# Patient Record
Sex: MALE | URBAN - METROPOLITAN AREA
[De-identification: names, ages, dates, MRNs, and addresses within clinical notes are randomized per-mention and may not be internally consistent; named-entity substitution may affect disease eponyms.]

---

## 2018-11-27 ENCOUNTER — TELEPHONE (OUTPATIENT)
Dept: FAMILY MEDICINE | Facility: CLINIC | Age: 43
End: 2018-11-27

## 2018-11-27 ENCOUNTER — OFFICE VISIT (OUTPATIENT)
Dept: FAMILY MEDICINE | Facility: CLINIC | Age: 43
End: 2018-11-27
Payer: COMMERCIAL

## 2018-11-27 ENCOUNTER — RADIANT APPOINTMENT (OUTPATIENT)
Dept: GENERAL RADIOLOGY | Facility: CLINIC | Age: 43
End: 2018-11-27
Attending: FAMILY MEDICINE
Payer: COMMERCIAL

## 2018-11-27 VITALS
HEART RATE: 87 BPM | DIASTOLIC BLOOD PRESSURE: 84 MMHG | WEIGHT: 187 LBS | SYSTOLIC BLOOD PRESSURE: 123 MMHG | OXYGEN SATURATION: 96 % | TEMPERATURE: 97.8 F

## 2018-11-27 DIAGNOSIS — G89.29 ACUTE EXACERBATION OF CHRONIC LOW BACK PAIN: Primary | ICD-10-CM

## 2018-11-27 DIAGNOSIS — G89.29 ACUTE EXACERBATION OF CHRONIC LOW BACK PAIN: ICD-10-CM

## 2018-11-27 DIAGNOSIS — E11.8 TYPE 2 DIABETES MELLITUS WITH COMPLICATION, WITHOUT LONG-TERM CURRENT USE OF INSULIN (H): ICD-10-CM

## 2018-11-27 DIAGNOSIS — M54.50 ACUTE EXACERBATION OF CHRONIC LOW BACK PAIN: Primary | ICD-10-CM

## 2018-11-27 DIAGNOSIS — Z98.890 HX OF LUMBAR DISCECTOMY: ICD-10-CM

## 2018-11-27 DIAGNOSIS — M54.50 ACUTE EXACERBATION OF CHRONIC LOW BACK PAIN: ICD-10-CM

## 2018-11-27 PROCEDURE — 99214 OFFICE O/P EST MOD 30 MIN: CPT | Mod: 25 | Performed by: FAMILY MEDICINE

## 2018-11-27 PROCEDURE — 72100 X-RAY EXAM L-S SPINE 2/3 VWS: CPT | Mod: FY

## 2018-11-27 PROCEDURE — 96372 THER/PROPH/DIAG INJ SC/IM: CPT | Performed by: FAMILY MEDICINE

## 2018-11-27 RX ORDER — GLIPIZIDE 5 MG/1
TABLET, FILM COATED, EXTENDED RELEASE ORAL
Qty: 90 TABLET | Refills: 1
Start: 2018-11-27

## 2018-11-27 RX ORDER — KETOROLAC TROMETHAMINE 30 MG/ML
60 INJECTION, SOLUTION INTRAMUSCULAR; INTRAVENOUS ONCE
Qty: 2 ML | Refills: 0 | OUTPATIENT
Start: 2018-11-27 | End: 2018-11-27

## 2018-11-27 RX ORDER — TRAMADOL HYDROCHLORIDE 50 MG/1
TABLET ORAL
Qty: 30 TABLET | Refills: 0 | Status: SHIPPED | OUTPATIENT
Start: 2018-11-27 | End: 2020-01-13

## 2018-11-27 RX ORDER — CYCLOBENZAPRINE HCL 10 MG
TABLET ORAL
Qty: 30 TABLET | Refills: 1 | Status: SHIPPED | OUTPATIENT
Start: 2018-11-27 | End: 2020-01-13

## 2018-11-27 RX ORDER — DICLOFENAC SODIUM 75 MG/1
75 TABLET, DELAYED RELEASE ORAL 2 TIMES DAILY PRN
Qty: 60 TABLET | Refills: 1 | Status: SHIPPED | OUTPATIENT
Start: 2018-11-27 | End: 2020-01-13

## 2018-11-27 RX ORDER — PREDNISONE 20 MG/1
TABLET ORAL
Qty: 10 TABLET | Refills: 0 | Status: SHIPPED | OUTPATIENT
Start: 2018-11-27 | End: 2020-01-13

## 2018-11-27 ASSESSMENT — PAIN SCALES - GENERAL: PAINLEVEL: WORST PAIN (10)

## 2018-11-27 NOTE — MR AVS SNAPSHOT
After Visit Summary   11/27/2018    Winston Olsen    MRN: [id number]           Patient Information     Date Of Birth          1975        Visit Information        Provider Department      11/27/2018 10:00 AM Melyssa Bell MD [facility]        Today's Diagnoses     Acute exacerbation of chronic low back pain    -  1    Hx of lumbar discectomy           Follow-ups after your visit        Additional Services     PHYSICAL THERAPY REFERRAL       If you have not heard from the scheduling office within 2 business days, please call [phone] for all locations, with the exception of [place], please call [phone] and [place], please call [phone].    Please be aware that coverage of these services is subject to the terms and limitations of your health insurance plan.  Call member services at your health plan with any benefit or coverage questions.                  Follow-up notes from your care team     Return in about 4 weeks (around 12/25/2018), or if symptoms worsen or fail to improve.      Future tests that were ordered for you today     Open Future Orders        Priority Expected Expires Ordered    PHYSICAL THERAPY REFERRAL Routine  11/27/2019 11/27/2018            Who to contact     If you have questions or need follow up information about today's clinic visit or your schedule please contact [facility] directly at [phone].  Normal or non-critical lab and imaging results will be communicated to you by MyChart, letter or phone within 4 business days after the clinic has received the results. If you do not hear from us within 7 days, please contact the clinic through MyChart or phone. If you have a critical or abnormal lab result, we will notify you by phone as soon as possible.  Submit refill requests through [employer] or call your pharmacy and they will forward the refill request to us. Please allow 3 business days for your refill to be  completed.          Additional Information About Your Visit        Care EveryWhere ID     This is your Care EveryWhere ID. This could be used by other organizations to access your [place] medical records  [id number]        Your Vitals Were     Pulse Temperature Pulse Oximetry             87 97.8  F (36.6  C) (Oral) 96%          Blood Pressure from Last 3 Encounters:   11/27/18 123/84    Weight from Last 3 Encounters:   11/27/18 187 lb (84.8 kg)                 Today's Medication Changes          These changes are accurate as of 11/27/18 10:44 AM.  If you have any questions, ask your nurse or doctor.               Start taking these medicines.        Dose/Directions    cyclobenzaprine 10 MG tablet   Commonly known as:  FLEXERIL   Used for:  Acute exacerbation of chronic low back pain   Started by:  Melyssa Bell MD        One at bedtime as needed   Quantity:  30 tablet   Refills:  1       diclofenac 75 MG EC tablet   Commonly known as:  VOLTAREN   Used for:  Acute exacerbation of chronic low back pain   Started by:  Melyssa Bell MD        Dose:  75 mg   Take 1 tablet (75 mg) by mouth 2 times daily as needed for moderate pain   Quantity:  60 tablet   Refills:  1       ketorolac 60 MG/2ML injection   Commonly known as:  TORADOL   Used for:  Acute exacerbation of chronic low back pain   Started by:  Melyssa Bell MD        Dose:  60 mg   Inject 2 mLs (60 mg) into the muscle once for 1 dose   Quantity:  2 mL   Refills:  0       predniSONE 20 MG tablet   Commonly known as:  DELTASONE   Used for:  Acute exacerbation of chronic low back pain   Started by:  Melyssa Bell MD        2 tab daily for 5 days   Quantity:  10 tablet   Refills:  0       traMADol 50 MG tablet   Commonly known as:  ULTRAM   Used for:  Acute exacerbation of chronic low back pain   Started by:  Melyssa Bell MD        One tab at bedtime as needed   Quantity:  30 tablet   Refills:  0            Where to get your medicines      These  medications were sent to [facility] [address] [place], [place] - [address] [facility] [address]  [address] [facility] [address]     Phone:  [phone]     cyclobenzaprine 10 MG tablet    diclofenac 75 MG EC tablet    predniSONE 20 MG tablet         Some of these will need a paper prescription and others can be bought over the counter.  Ask your nurse if you have questions.     Bring a paper prescription for each of these medications     traMADol 50 MG tablet       You don't need a prescription for these medications     ketorolac 60 MG/2ML injection               Information about OPIOIDS     PRESCRIPTION OPIOIDS: WHAT YOU NEED TO KNOW   We gave you an opioid (narcotic) pain medicine. It is important to manage your pain, but opioids are not always the best choice. You should first try all the other options your care team gave you. Take this medicine for as short a time (and as few doses) as possible.    Some activities can increase your pain, such as bandage changes or therapy sessions. It may help to take your pain medicine 30 to 60 minutes before these activities. Reduce your stress by getting enough sleep, working on hobbies you enjoy and practicing relaxation or meditation. Talk to your care team about ways to manage your pain beyond prescription opioids.    These medicines have risks:    DO NOT drive when on new or higher doses of pain medicine. These medicines can affect your alertness and reaction times, and you could be arrested for driving under the influence (DUI). If you need to use opioids long-term, talk to your care team about driving.    DO NOT operate heavy machinery    DO NOT do any other dangerous activities while taking these medicines.    DO NOT drink any alcohol while taking these medicines.     If the opioid prescribed includes acetaminophen, DO NOT take with any other medicines that contain acetaminophen. Read all labels carefully. Look for the word   acetaminophen  or  Tylenol.  Ask your pharmacist if you have questions or are unsure.    You can get addicted to pain medicines, especially if you have a history of addiction (chemical, alcohol or substance dependence). Talk to your care team about ways to reduce this risk.    All opioids tend to cause constipation. Drink plenty of water and eat foods that have a lot of fiber, such as fruits, vegetables, prune juice, apple juice and high-fiber cereal. Take a laxative (Miralax, milk of magnesia, Colace, Senna) if you don t move your bowels at least every other day. Other side effects include upset stomach, sleepiness, dizziness, throwing up, tolerance (needing more of the medicine to have the same effect), physical dependence and slowed breathing.    Store your pills in a secure place, locked if possible. We will not replace any lost or stolen medicine. If you don t finish your medicine, please throw away (dispose) as directed by your pharmacist. The [place] Pollution [employer] has more information about safe disposal: https://[email or web address].[facility].mn.us/living-green/managing-unwanted-medications         Primary Care Provider Office Phone # Fax #    Nataly Poe [phone]       [address] [facility] [address]        Equal Access to Services     DAINA LEIJA : Hadii garcía ku hadasho Soomaali, waaxda luqadaha, qaybta kaalmada adeegyada, patrizia rowe. So [facility] [phone].    ATENCIÓN: Si habla español, tiene a stoll disposición servicios gratuitos de asistencia lingüística. Llame al [phone].    We comply with applicable federal civil rights laws and [place] laws. We do not discriminate on the basis of race, color, national origin, age, disability, sex, sexual orientation, or gender identity.            Thank you!     Thank you for choosing [facility]  for your care. Our goal is always to provide you with excellent care. Hearing back  from our patients is one way we can continue to improve our services. Please take a few minutes to complete the written survey that you may receive in the mail after your visit with us. Thank you!             Your Updated Medication List - Protect others around you: Learn how to safely use, store and throw away your medicines at www.disposemymeds.org.          This list is accurate as of 11/27/18 10:44 AM.  Always use your most recent med list.                   Brand Name Dispense Instructions for use Diagnosis    cyclobenzaprine 10 MG tablet    FLEXERIL    30 tablet    One at bedtime as needed    Acute exacerbation of chronic low back pain       diclofenac 75 MG EC tablet    VOLTAREN    60 tablet    Take 1 tablet (75 mg) by mouth 2 times daily as needed for moderate pain    Acute exacerbation of chronic low back pain       ketorolac 60 MG/2ML injection    TORADOL    2 mL    Inject 2 mLs (60 mg) into the muscle once for 1 dose    Acute exacerbation of chronic low back pain       predniSONE 20 MG tablet    DELTASONE    10 tablet    2 tab daily for 5 days    Acute exacerbation of chronic low back pain       traMADol 50 MG tablet    ULTRAM    30 tablet    One tab at bedtime as needed    Acute exacerbation of chronic low back pain

## 2018-11-27 NOTE — PROGRESS NOTES
SUBJECTIVE:                                                    Winston Olsen is a 43 year old male who presents to clinic today for the following health issues:  Patient with previous history of chronic low back pain, status post L4-L5 discectomy in 2003.  Comes in today with history of increased lower back pain, for the past 1-2 days.  He denies any recent injury or trauma denies any unusual activities.    He reports he was lying on the couch and he tried to lift himself, he felt pain in his lower back and since that time it has been very stiff he is getting more numbness and tingling down both of his legs.  He denies any loss of urine or stool denies constipation.  Denies any loss of strength in his lower extremity.  He does have a good sensation to both of his lower extremity.  Denies any numbness or tingling around his rectum or anus area. Gaits been slow.      Duration: Three days; dates back to 2003        Specific cause: none    Description:   Location of pain: low back both  Character of pain: sharp and constant  Pain radiation:radiates into the left leg  New numbness or weakness in legs, not attributed to pain:  no     Intensity: Currently 10/10, At its worst 10/10    History:   Pain interferes with job: Not applicable,   History of back problems: Yes  Any previous MRI or X-rays: None  Sees a specialist for back pain:  No  Therapies tried without relief: Aleve and cold, massage machine    Alleviating factors:   Improved by: cold      Precipitating factors:  Worsened by: Lifting, Bending, Standing, Sitting, Lying Flat and Walking          Accompanying Signs & Symptoms:  Risk of Fracture:  None  Risk of Cauda Equina:  None  Risk of Infection:  None  Risk of Cancer:  None  Risk of Ankylosing Spondylitis:  Onset at age <35, male, AND morning back stiffness. no         Problem list and histories reviewed & adjusted, as indicated.  Additional history: as documented    Patient Active Problem List   Diagnosis      Hx of lumbar discectomy     Chronic low back pain without sciatica, unspecified back pain laterality     Diabetes mellitus, type 2 (H)     Past Surgical History:   Procedure Laterality Date     DISKECTOMY, LUMBAR, SINGLE SP  2003    L4/L5       Social History   Substance Use Topics     Smoking status: Current Every Day Smoker     Types: Hookah     Smokeless tobacco: Never Used     Alcohol use No     History reviewed. No pertinent family history.      Current Outpatient Prescriptions   Medication Sig Dispense Refill     cyclobenzaprine (FLEXERIL) 10 MG tablet One at bedtime as needed 30 tablet 1     diclofenac (VOLTAREN) 75 MG EC tablet Take 1 tablet (75 mg) by mouth 2 times daily as needed for moderate pain 60 tablet 1     glipiZIDE (GLIPIZIDE XL) 5 MG 24 hr tablet 1  And 1/2 tab daily ( 7.5 mg) 90 tablet 1     metFORMIN (GLUCOPHAGE) 1000 MG tablet Take 1 tablet (1,000 mg) by mouth 2 times daily (with meals) 180 tablet 3     predniSONE (DELTASONE) 20 MG tablet 2 tab daily for 5 days 10 tablet 0     traMADol (ULTRAM) 50 MG tablet One tab at bedtime as needed 30 tablet 0     Not on File    ROS:  Constitutional, HEENT, cardiovascular, pulmonary, gi and gu systems are negative, except as otherwise noted.    OBJECTIVE:     /84 (BP Location: Right arm, Patient Position: Chair, Cuff Size: Adult Regular)  Pulse 87  Temp 97.8  F (36.6  C) (Oral)  Wt 187 lb (84.8 kg)  SpO2 96%  There is no height or weight on file to calculate BMI.  GENERAL APPEARANCE: healthy, alert and mild distress  ORTHO: Lumber/Thoracic Spine Exam: Tender:  lumbar facet joints, left para lumbar muscles  Non-tender:  thoracic spinous processes, thoracic facet joints, left parathoracic muscles, right parathoracic muscles  Range of Motion:  lumbar flexion  decreased, painful, lumbar extension  decreased, painful, left lateral lumbar bending  decreased, painful, right lateral lumbar bending  decreased, left lateral lumbar rotation  decreased,  painful, right lateral lumbar rotation  decreased  Strength:  able to heel walk, able to toe walk  Special tests:  positive left straight leg raise, positive right straight leg raise    Hip Exam: Hip ROM full    NEURO: Normal strength and tone, mentation intact and speech normal    Diagnostic Test Results:  XR lumbar: no acute finding, discussed with pt.  Final result: pending.    ASSESSMENT/PLAN:         ICD-10-CM    1. Acute exacerbation of chronic low back pain M54.5 XR Lumbar Spine 2/3 Views    G89.29 predniSONE (DELTASONE) 20 MG tablet     cyclobenzaprine (FLEXERIL) 10 MG tablet     traMADol (ULTRAM) 50 MG tablet     diclofenac (VOLTAREN) 75 MG EC tablet     PHYSICAL THERAPY REFERRAL     KETOROLAC TROMETHAMINE 15MG     INJECTION INTRAMUSCULAR OR SUB-Q     DISCONTINUED: ketorolac (TORADOL) 60 MG/2ML injection   2. Hx of lumbar discectomy Z98.890 KETOROLAC TROMETHAMINE 15MG   3. Type 2 diabetes mellitus with complication, without long-term current use of insulin (H) E11.8 metFORMIN (GLUCOPHAGE) 1000 MG tablet     glipiZIDE (GLIPIZIDE XL) 5 MG 24 hr tablet     Referred to PT,   Take medications as been prescribed.  Regular exercise  Follow up in 4- 6 weeks or sooner    Melyssa Bell MD  [facility]

## 2018-11-27 NOTE — TELEPHONE ENCOUNTER
Called [phone] (home).  The number is disconnected.  No other number available.  Routed to TC to please send letter.    Veronica Leyva RN [facility] Triage.

## 2018-11-27 NOTE — TELEPHONE ENCOUNTER
Please inform pt. Of his XR result  No acute finding,  Mild arthritis  Continue with current medications  Follow up with PT  Return to clinic in 4- 6 weeks or sooner  thanks

## 2020-01-13 ENCOUNTER — ANCILLARY PROCEDURE (OUTPATIENT)
Dept: MRI IMAGING | Facility: CLINIC | Age: 45
End: 2020-01-13
Attending: FAMILY MEDICINE
Payer: COMMERCIAL

## 2020-01-13 ENCOUNTER — OFFICE VISIT (OUTPATIENT)
Dept: FAMILY MEDICINE | Facility: CLINIC | Age: 45
End: 2020-01-13
Payer: COMMERCIAL

## 2020-01-13 VITALS
WEIGHT: 181 LBS | SYSTOLIC BLOOD PRESSURE: 113 MMHG | HEART RATE: 86 BPM | BODY MASS INDEX: 27.43 KG/M2 | HEIGHT: 68 IN | OXYGEN SATURATION: 97 % | TEMPERATURE: 97.7 F | DIASTOLIC BLOOD PRESSURE: 75 MMHG

## 2020-01-13 DIAGNOSIS — G89.29 ACUTE EXACERBATION OF CHRONIC LOW BACK PAIN: Primary | ICD-10-CM

## 2020-01-13 DIAGNOSIS — G89.29 ACUTE EXACERBATION OF CHRONIC LOW BACK PAIN: ICD-10-CM

## 2020-01-13 DIAGNOSIS — M54.50 ACUTE EXACERBATION OF CHRONIC LOW BACK PAIN: ICD-10-CM

## 2020-01-13 DIAGNOSIS — E11.69 TYPE 2 DIABETES MELLITUS WITH OTHER SPECIFIED COMPLICATION, WITHOUT LONG-TERM CURRENT USE OF INSULIN (H): ICD-10-CM

## 2020-01-13 DIAGNOSIS — M54.50 ACUTE EXACERBATION OF CHRONIC LOW BACK PAIN: Primary | ICD-10-CM

## 2020-01-13 DIAGNOSIS — Z98.890 HX OF LUMBAR DISCECTOMY: ICD-10-CM

## 2020-01-13 PROCEDURE — 96372 THER/PROPH/DIAG INJ SC/IM: CPT | Performed by: FAMILY MEDICINE

## 2020-01-13 PROCEDURE — 72148 MRI LUMBAR SPINE W/O DYE: CPT | Mod: TC

## 2020-01-13 PROCEDURE — 99214 OFFICE O/P EST MOD 30 MIN: CPT | Mod: 25 | Performed by: FAMILY MEDICINE

## 2020-01-13 RX ORDER — DICLOFENAC SODIUM 75 MG/1
75 TABLET, DELAYED RELEASE ORAL 2 TIMES DAILY PRN
Qty: 60 TABLET | Refills: 1 | Status: SHIPPED | OUTPATIENT
Start: 2020-01-13 | End: 2021-02-15

## 2020-01-13 RX ORDER — TRAMADOL HYDROCHLORIDE 50 MG/1
TABLET ORAL
Qty: 30 TABLET | Refills: 0 | Status: SHIPPED | OUTPATIENT
Start: 2020-01-13

## 2020-01-13 RX ORDER — PREDNISONE 20 MG/1
TABLET ORAL
Qty: 10 TABLET | Refills: 0 | Status: SHIPPED | OUTPATIENT
Start: 2020-01-13

## 2020-01-13 RX ORDER — KETOROLAC TROMETHAMINE 30 MG/ML
60 INJECTION, SOLUTION INTRAMUSCULAR; INTRAVENOUS ONCE
Status: COMPLETED | OUTPATIENT
Start: 2020-01-13 | End: 2020-01-13

## 2020-01-13 RX ORDER — CYCLOBENZAPRINE HCL 10 MG
TABLET ORAL
Qty: 30 TABLET | Refills: 1 | Status: SHIPPED | OUTPATIENT
Start: 2020-01-13

## 2020-01-13 RX ADMIN — KETOROLAC TROMETHAMINE 60 MG: 30 INJECTION, SOLUTION INTRAMUSCULAR; INTRAVENOUS at 10:13

## 2020-01-13 ASSESSMENT — MIFFLIN-ST. JEOR: SCORE: 1682.26

## 2020-01-13 ASSESSMENT — PAIN SCALES - GENERAL: PAINLEVEL: WORST PAIN (10)

## 2020-01-13 NOTE — PROGRESS NOTES
Subjective     Winston Olsen is a 45 year old male who presents to clinic today for the following health issues:    HPI :  Patient with history of chronic low back pain, status post L4-L5 dicyclomine 2003.  He reports for the past few days he has been getting more pain to his lower back.  More numbness to his lower extremities.  He denies any heavy lifting, or twisting.  He reports year ago he had similar episode.  He denies loss of urine or stool.  He denies loss of lower extremity strength or sensation.  History of diabetes he reports is been controlled he does see a primary care physician next with [facility].  Back Pain       Duration: Saturday        Specific cause: lifting    Description:   Location of pain: low back right  Character of pain: sharp, stabbing and constant  Pain radiation:none  New numbness or weakness in legs, not attributed to pain:  no     Intensity: Currently 10/10, At its worst 10/10    History:   Pain interferes with job: YES  History of back problems: had back surgery in 2003  Any previous MRI or X-rays: Yes- at [place].  Date 11-  Sees a specialist for back pain:  No  Therapies tried without relief: Aleve and Advil    Alleviating factors:   Improved by: none      Precipitating factors:  Worsened by: Lifting, Bending, Standing, Sitting, Lying Flat and Walking          Accompanying Signs & Symptoms:  Risk of Fracture:  None  Risk of Cauda Equina:  None  Risk of Infection:  None  Risk of Cancer:  None  Risk of Ankylosing Spondylitis:  Onset at age <35, male, AND morning back stiffness. no       Patient Active Problem List   Diagnosis     Hx of lumbar discectomy     Chronic low back pain without sciatica, unspecified back pain laterality     Diabetes mellitus, type 2 (H)     Past Surgical History:   Procedure Laterality Date     DISKECTOMY, LUMBAR, SINGLE SP  2003    L4/L5       Social History     Tobacco Use     Smoking status: Current Every Day Smoker     Types: Hookah      Smokeless tobacco: Never Used   Substance Use Topics     Alcohol use: No     History reviewed. No pertinent family history.      Current Outpatient Medications   Medication Sig Dispense Refill     cyclobenzaprine (FLEXERIL) 10 MG tablet One at bedtime as needed 30 tablet 1     diclofenac (VOLTAREN) 75 MG EC tablet Take 1 tablet (75 mg) by mouth 2 times daily as needed for moderate pain 60 tablet 1     glipiZIDE (GLIPIZIDE XL) 5 MG 24 hr tablet 1  And 1/2 tab daily ( 7.5 mg) 90 tablet 1     metFORMIN (GLUCOPHAGE) 1000 MG tablet Take 1 tablet (1,000 mg) by mouth 2 times daily (with meals) 180 tablet 3     predniSONE (DELTASONE) 20 MG tablet 2 tab daily for 5 days 10 tablet 0     traMADol (ULTRAM) 50 MG tablet One tab at bedtime as needed 30 tablet 0     No Known Allergies    Reviewed and updated as needed this visit by Provider         Review of Systems   ROS COMP: Constitutional, HEENT, cardiovascular, pulmonary, gi and gu systems are negative, except as otherwise noted.      Objective    There were no vitals taken for this visit.  There is no height or weight on file to calculate BMI.  Physical Exam   GENERAL APPEARANCE: healthy, alert and moderate distress  SKIN: no suspicious lesions or rashes  NEURO: Normal strength and tone, mentation intact, speech normal and DTR symmetrically normal in lower extremities  DIABETIC FOOT EXAM: normal DP and PT pulses, no trophic changes or ulcerative lesions and normal sensory exam  PSYCH: mentation appears normal and affect normal/bright  Back exam was limited secondary to patient pain.  none         Assessment & Plan       ICD-10-CM    1. Acute exacerbation of chronic low back pain M54.5 ketorolac (TORADOL) injection 60 mg    G89.29 cyclobenzaprine (FLEXERIL) 10 MG tablet     predniSONE (DELTASONE) 20 MG tablet     traMADol (ULTRAM) 50 MG tablet     diclofenac (VOLTAREN) 75 MG EC tablet     MR Lumbar Spine w/o Contrast   2. Type 2 diabetes mellitus with other specified  complication, without long-term current use of insulin (H) E11.69    3. Hx of lumbar discectomy Z98.890      Acute on chronic low back pain, status post dicyclomine 2003.  Patient was advised to remain active.  He has declined physical therapy.  he has not had an MRI in over 10 years will be scheduled to have an MRI.    He was given Toradol injection IM 60 mg in the office today which he tolerated well.  In addition, continue with prednisone, Flexeril tramadol diclofenac use as been prescribed.    History of diabetes, patient has follow-up [facility].      There are no Patient Instructions on file for this visit.    Return in about 3 months (around 4/13/2020) for Physical Exam.    Melyssa Bell MD  [facility]

## 2020-01-14 ENCOUNTER — TELEPHONE (OUTPATIENT)
Dept: FAMILY MEDICINE | Facility: CLINIC | Age: 45
End: 2020-01-14

## 2020-01-14 DIAGNOSIS — M54.50 CHRONIC LOW BACK PAIN WITHOUT SCIATICA, UNSPECIFIED BACK PAIN LATERALITY: Primary | ICD-10-CM

## 2020-01-14 DIAGNOSIS — G89.29 CHRONIC LOW BACK PAIN WITHOUT SCIATICA, UNSPECIFIED BACK PAIN LATERALITY: Primary | ICD-10-CM

## 2020-01-14 DIAGNOSIS — Z98.890 HX OF LUMBAR DISCECTOMY: ICD-10-CM

## 2020-01-14 NOTE — TELEPHONE ENCOUNTER
----- Message from Melyssa Bell MD sent at 1/14/2020  8:34 AM CST -----  Please inform pt. Of his MRI result  Has a disc protrusion, ( disc herniations ) at L3- 4, and smaller at L4-5 and L5- S1, more at L3- 4, causing, mild to moderate center canal stenosis  Moderate level Degenerative disc disease,     Have pt. Continue with home,  daily exercises, stay active.  Continue with medications as been prescribed  If willing, I could refer to PT  If continue to have pain, he could follow up or return to clinic and may refer to Cortisone injections  Thanks

## 2020-01-15 NOTE — TELEPHONE ENCOUNTER
Called patient at mobile number. Left message to return call to nurse triage line at [phone].    Alice Connors RN

## 2020-01-17 NOTE — TELEPHONE ENCOUNTER
I called and spoke to patient at mobile number, he is at work, advised him of result of MRI and plan.    He is interested in PT referral.    Routed to provider to place referral.   Then route to team to call patient with contact information.    Trina Hammonds RN  [facility]

## 2020-01-17 NOTE — TELEPHONE ENCOUNTER
See contact information from PT referral:    01/17/20 [id number] Melyssa Austin MD    Future Order Information     Expires       1/17/2021              Comments     If you have not heard from the scheduling office within 2 business days, please call [phone] for all locations     I called patient and advised him of the phone number to call to schedule PT.       Trina Hammonds RN  [facility]

## 2020-01-20 ENCOUNTER — THERAPY VISIT (OUTPATIENT)
Dept: PHYSICAL THERAPY | Facility: CLINIC | Age: 45
End: 2020-01-20
Attending: FAMILY MEDICINE
Payer: COMMERCIAL

## 2020-01-20 DIAGNOSIS — M54.50 CHRONIC LOW BACK PAIN WITHOUT SCIATICA, UNSPECIFIED BACK PAIN LATERALITY: ICD-10-CM

## 2020-01-20 DIAGNOSIS — Z98.890 HX OF LUMBAR DISCECTOMY: ICD-10-CM

## 2020-01-20 DIAGNOSIS — G89.29 CHRONIC LOW BACK PAIN WITHOUT SCIATICA, UNSPECIFIED BACK PAIN LATERALITY: ICD-10-CM

## 2020-01-20 PROCEDURE — 97161 PT EVAL LOW COMPLEX 20 MIN: CPT | Mod: GP | Performed by: PHYSICAL THERAPIST

## 2020-01-20 PROCEDURE — 97112 NEUROMUSCULAR REEDUCATION: CPT | Mod: GP | Performed by: PHYSICAL THERAPIST

## 2020-01-20 PROCEDURE — 97110 THERAPEUTIC EXERCISES: CPT | Mod: GP | Performed by: PHYSICAL THERAPIST

## 2020-01-20 PROCEDURE — 97140 MANUAL THERAPY 1/> REGIONS: CPT | Mod: GP | Performed by: PHYSICAL THERAPIST

## 2020-01-20 NOTE — LETTER
AJITH STEARNS PT  [address]  [address]  [place] [address]  [phone]    2020    Re: Winston Olsen   :   1975  MRN:  [id number]   REFERRING PHYSICIAN:   MD AJITH Haddad PT  Date of Initial Evaluation:  2020  Visits:  Rxs Used: 1  Reason for Referral:     Hx of lumbar discectomy  Chronic low back pain without sciatica, unspecified back pain laterality    EVALUATION SUMMARY    [facility] Initial Evaluation  Subjective:  The history is provided by the patient. No  was used.   Winston Olsen being seen for low back pain.   Problem began 2020. Where condition occurred: at home and at work.Problem occurred: disc surgery   and reported as 9/10 on pain scale. General health as reported by patient is good. Pertinent medical history includes:  Diabetes and other. Other medical history details: hep B.  Other medications details: melformin pain.   Primary job tasks include:  Prolonged standing and repetitive tasks. 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  Pain is described as other (can't move well, catches, Hard time describing his pain) and is constant. Pain is worse in the P.M.. Since onset symptoms are unchanged.  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 Later reports he is working but not doing full tasks ( English not native language). Restrictions include:  Working in normal job with restrictions.    Barriers include:  None as reported by patient.  Red flags:  None as reported by patient.  Type of problem:  Lumbar and sacroiliac  This is a recurrent condition   Problem details: Has a 2 year old  Having neck pain now too  Can't do all work task due to pain  Had traction in the past that helped now does inversion at home  Pt reports lots of diarrhea .   Patient reports pain:  Central lumbar spine, lower lumbar spine, lumbar spine right, lumbar spine left, mid lumbar  spine and SI joint left. Radiates to:  Knee left and thigh left. Associated symptoms:  Edema and tingling. Symptoms are exacerbated by bending, carrying, lifting, sitting, lying down and twisting (transfers painful, hard getting shoes and socks on) and relieved by ice and analgesics.              Objective:  Standing Alignment:    Cervical/Thoracic:  Forward head  Lumbar:  Lordosis decr (Pt sitttng posture  very poor)    Re: iWnston Olsen   :   1975       Lumbar/SI Evaluation  ROM:    AROM Lumbar:   Flexion:          Hands to knees painful  Ext:                    Minimal movement   Side Bend:        Left:  Minimal movement with QL discomfort    Right:  Miinmal mvmt with QL discomfort  Rotation:           Left:     Right:   Side Glide:        Left:     Right:   Strength: toe walKing OK heel walking very hard    Neural Tension/Mobility:    Left side:  SLR w/DF positive.  Left side:SLR  negative.     Lumbar Palpation:    Tenderness present at Left:    Erector Spinae  Tenderness present at Right: Erector Spinae    Spinal Segmental Conclusions: With spring testing  Level: Hypo noted at L3, L4 and L5    SI joint/Sacrum:      Sacral conclusion right:  Anterior inominate             Hip Evaluation  Hip Special Testing:    Left hip positive for the following special tests:  Piriformis      Hip Palpation:    Left hip tenderness present at:   Piriformis    Pt carries wallet in L back pocket advised to carry wallet in  front or side pockets  When patient laid supine he started nodding off very tired with decreased sleep with his back pain  General     Review of Systems   Gastrointestinal: Positive for diarrhea.   Musculoskeletal: Positive for back pain.   Neurological: Positive for tingling.     Patient has paradoxical breathing with most exercises this will need good practice to perform correctly     Patient's paraspinals are much more relaxed at the end of the session.     Assessment/Plan:    Patient is a 45 year old  male with lumbar complaints.    Re: Winston Olsen   :   1975    Patient has the following significant findings with corresponding treatment plan.                Diagnosis 1: Chronic low back pain   Pain -  hot/cold therapy, mechanical traction, manual therapy, self management, education and home program  Decreased ROM/flexibility - manual therapy, therapeutic exercise, therapeutic activity and home program  Decreased joint mobility - manual therapy, therapeutic exercise, therapeutic activity and home program  Impaired muscle performance - neuro re-education and home program  Decreased function - therapeutic activities and home program  Impaired posture - neuro re-education, therapeutic activities and home program    Therapy Eval  Cumulative Therapy Evaluation is: Low complexity.  Previous and current functional limitations:  (See Goal Flow Sheet for this information)    Short term and Long term goals: (See Goal Flow Sheet for this information)     Communication ability:  Patient appears to be able to clearly communicate and understand verbal and written communication and follow directions correctly..(May need more time and clarification due to English not native language)    Treatment Explanation - The following has been discussed with the patient:   RX ordered/plan of care  Anticipated outcomes  Possible risks and side effects  This patient would benefit from PT intervention to resume normal activities.   Rehab potential is good.    Frequency:  1 X week, once daily  Duration:  for 6 weeks  Discharge Plan:  Achieve all LTG.  Independent in home treatment program.  Return to previous functional level by discharge.  Reach maximal therapeutic benefit.    Please refer to the daily flowsheet for treatment today, total treatment time and time spent performing 1:1 timed codes.       Thank you for your referral.    INQUIRIES  Therapist: Sully Jean, PT   AJITH STEARNS PT  [address]  [place] [address]  Phone: [phone]  Fax: [phone]

## 2020-01-20 NOTE — PROGRESS NOTES
[facility] Initial Evaluation  Subjective:             Current medications:  Pain medication.                                         Objective:  System    Physical Exam    General     ROS    Assessment/Plan:

## 2020-01-20 NOTE — PROGRESS NOTES
[facility] Initial Evaluation  Subjective:  The history is provided by the patient. No  was used.   Winston Olsen being seen for low back pain.   Problem began 1/1/2020. Where condition occurred: at home and at work.Problem occurred: disc surgery 2003  and reported as 9/10 on pain scale. General health as reported by patient is good. Pertinent medical history includes:  Diabetes and other. Other medical history details: hep B.       Other medications details: melformin pain.   Primary job tasks include:  Prolonged standing and repetitive tasks. 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  Pain is described as other (can't move well, catches, Hard time describing his pain) and is constant. Pain is worse in the P.M.. Since onset symptoms are unchanged.      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 Later reports he is working but not doing full tasks ( English not native language). Restrictions include:  Working in normal job with restrictions.    Barriers include:  None as reported by patient.  Red flags:  None as reported by patient.  Type of problem:  Lumbar and sacroiliac    This is a recurrent condition   Problem details: Has a 2 year old  Having neck pain now too  Can't do all work task due to pain  Had traction in the past that helped now does inversion at home  Pt reports lots of diarrhea .   Patient reports pain:  Central lumbar spine, lower lumbar spine, lumbar spine right, lumbar spine left, mid lumbar spine and SI joint left. Radiates to:  Knee left and thigh left. Associated symptoms:  Edema and tingling. Symptoms are exacerbated by bending, carrying, lifting, sitting, lying down and twisting (transfers painful, hard getting shoes and socks on) and relieved by ice and analgesics.                      Objective:  Standing Alignment:    Cervical/Thoracic:  Forward head    Lumbar:  Lordosis decr (Pt sitttng posture  very  poor)                           Lumbar/SI Evaluation  ROM:    AROM Lumbar:   Flexion:          Hands to knees painful  Ext:                    Minimal movement   Side Bend:        Left:  Minimal movement with QL discomfort    Right:  Miinmal mvmt with QL discomfort  Rotation:           Left:     Right:   Side Glide:        Left:     Right:         Strength: toe walKing OK heel walking very hard          Neural Tension/Mobility:    Left side:  SLR w/DF positive.  Left side:SLR  negative.     Lumbar Palpation:    Tenderness present at Left:    Erector Spinae  Tenderness present at Right: Erector Spinae      Spinal Segmental Conclusions: With spring testing    Level: Hypo noted at L3, L4 and L5      SI joint/Sacrum:              Sacral conclusion right:  Anterior inominate                                  Hip Evaluation      Hip Special Testing:    Left hip positive for the following special tests:  Piriformis      Hip Palpation:    Left hip tenderness present at:   Piriformis             Pt carries wallet in L back pocket advised to carry wallet in   front or side pockets  When patient laid supine he started nodding off very tired with decreased sleep with his back pain  General     Review of Systems   Gastrointestinal: Positive for diarrhea.   Musculoskeletal: Positive for back pain.   Neurological: Positive for tingling.     Patient has paradoxical breathing with most exercises this will need good practice to perform correctly     Patient's paraspinals are much more relaxed at the end of the session.   Assessment/Plan:    Patient is a 45 year old male with lumbar complaints.    Patient has the following significant findings with corresponding treatment plan.                Diagnosis 1: Chronic low back pain     Pain -  hot/cold therapy, mechanical traction, manual therapy, self management, education and home program  Decreased ROM/flexibility - manual therapy, therapeutic exercise, therapeutic activity and home  program  Decreased joint mobility - manual therapy, therapeutic exercise, therapeutic activity and home program  Impaired muscle performance - neuro re-education and home program  Decreased function - therapeutic activities and home program  Impaired posture - neuro re-education, therapeutic activities and home program    Therapy Eval  Cumulative Therapy Evaluation is: Low complexity.    Previous and current functional limitations:  (See Goal Flow Sheet for this information)    Short term and Long term goals: (See Goal Flow Sheet for this information)     Communication ability:  Patient appears to be able to clearly communicate and understand verbal and written communication and follow directions correctly..(May need more time and clarification due to English not native language)  .  Treatment Explanation - The following has been discussed with the patient:   RX ordered/plan of care  Anticipated outcomes  Possible risks and side effects  This patient would benefit from PT intervention to resume normal activities.   Rehab potential is good.    Frequency:  1 X week, once daily  Duration:  for 6 weeks  Discharge Plan:  Achieve all LTG.  Independent in home treatment program.  Return to previous functional level by discharge.  Reach maximal therapeutic benefit.    Please refer to the daily flowsheet for treatment today, total treatment time and time spent performing 1:1 timed codes.

## 2020-01-25 ASSESSMENT — ENCOUNTER SYMPTOMS
BACK PAIN: 1
TINGLING: 1
DIARRHEA: 1

## 2021-02-10 DIAGNOSIS — G89.29 ACUTE EXACERBATION OF CHRONIC LOW BACK PAIN: ICD-10-CM

## 2021-02-10 DIAGNOSIS — M54.50 ACUTE EXACERBATION OF CHRONIC LOW BACK PAIN: ICD-10-CM

## 2021-02-15 RX ORDER — DICLOFENAC SODIUM 75 MG/1
75 TABLET, DELAYED RELEASE ORAL 2 TIMES DAILY PRN
Qty: 60 TABLET | Refills: 1 | Status: SHIPPED | OUTPATIENT
Start: 2021-02-15

## 2021-02-15 NOTE — TELEPHONE ENCOUNTER
Routing refill request to provider for review/approval because:  Labs not current:  ALT, AST, CBC, BP  Patient needs to be seen because it has been more than 1 year since last office visit.      Sharla Marshall RN, BSN, PHN  [facility]: [place]

## 2021-05-25 ENCOUNTER — RECORDS - HEALTHEAST (OUTPATIENT)
Dept: ADMINISTRATIVE | Facility: CLINIC | Age: 46
End: 2021-05-25

## 2021-05-26 ENCOUNTER — RECORDS - HEALTHEAST (OUTPATIENT)
Dept: ADMINISTRATIVE | Facility: CLINIC | Age: 46
End: 2021-05-26

## 2021-05-27 ENCOUNTER — RECORDS - HEALTHEAST (OUTPATIENT)
Dept: ADMINISTRATIVE | Facility: CLINIC | Age: 46
End: 2021-05-27

## 2021-06-21 NOTE — TELEPHONE ENCOUNTER
Home number not in service.    Attempted to call patient at mobile number, left message on voicemail; patient was instructed to return call to [facility] RN directly on the RN call back line at [phone]   Trina Hammonds, RN  [facility]     yes